# Patient Record
Sex: FEMALE | Race: OTHER | Employment: FULL TIME | URBAN - METROPOLITAN AREA
[De-identification: names, ages, dates, MRNs, and addresses within clinical notes are randomized per-mention and may not be internally consistent; named-entity substitution may affect disease eponyms.]

---

## 2020-02-12 ENCOUNTER — TELEPHONE (OUTPATIENT)
Dept: HEMATOLOGY/ONCOLOGY | Facility: HOSPITAL | Age: 25
End: 2020-02-12

## 2020-02-13 ENCOUNTER — APPOINTMENT (OUTPATIENT)
Dept: HEMATOLOGY/ONCOLOGY | Facility: HOSPITAL | Age: 25
End: 2020-02-13
Attending: INTERNAL MEDICINE
Payer: COMMERCIAL

## 2020-02-27 ENCOUNTER — LAB ENCOUNTER (OUTPATIENT)
Dept: LAB | Facility: HOSPITAL | Age: 25
End: 2020-02-27
Attending: INTERNAL MEDICINE
Payer: COMMERCIAL

## 2020-02-27 ENCOUNTER — OFFICE VISIT (OUTPATIENT)
Dept: HEMATOLOGY/ONCOLOGY | Facility: HOSPITAL | Age: 25
End: 2020-02-27
Attending: INTERNAL MEDICINE
Payer: COMMERCIAL

## 2020-02-27 VITALS
OXYGEN SATURATION: 99 % | DIASTOLIC BLOOD PRESSURE: 63 MMHG | RESPIRATION RATE: 16 BRPM | BODY MASS INDEX: 29.53 KG/M2 | SYSTOLIC BLOOD PRESSURE: 106 MMHG | HEIGHT: 64 IN | WEIGHT: 173 LBS | TEMPERATURE: 98 F | HEART RATE: 62 BPM

## 2020-02-27 DIAGNOSIS — D69.6 THROMBOCYTOPENIA (HCC): Primary | ICD-10-CM

## 2020-02-27 DIAGNOSIS — D69.6 THROMBOCYTOPENIA (HCC): ICD-10-CM

## 2020-02-27 LAB
ALBUMIN SERPL-MCNC: 4 G/DL (ref 3.4–5)
ALBUMIN/GLOB SERPL: 1.1 {RATIO} (ref 1–2)
ALP LIVER SERPL-CCNC: 86 U/L (ref 52–144)
ALT SERPL-CCNC: 27 U/L (ref 13–56)
ANION GAP SERPL CALC-SCNC: 5 MMOL/L (ref 0–18)
AST SERPL-CCNC: 16 U/L (ref 15–37)
BASOPHILS # BLD AUTO: 0.02 X10(3) UL (ref 0–0.2)
BASOPHILS NFR BLD AUTO: 0.3 %
BILIRUB SERPL-MCNC: 0.4 MG/DL (ref 0.1–2)
BUN BLD-MCNC: 18 MG/DL (ref 7–18)
BUN/CREAT SERPL: 23.7 (ref 10–20)
CALCIUM BLD-MCNC: 8.9 MG/DL (ref 8.5–10.1)
CHLORIDE SERPL-SCNC: 109 MMOL/L (ref 98–112)
CO2 SERPL-SCNC: 28 MMOL/L (ref 21–32)
CREAT BLD-MCNC: 0.76 MG/DL (ref 0.55–1.02)
DEPRECATED RDW RBC AUTO: 36.1 FL (ref 35.1–46.3)
EOSINOPHIL # BLD AUTO: 0.17 X10(3) UL (ref 0–0.7)
EOSINOPHIL NFR BLD AUTO: 2.1 %
ERYTHROCYTE [DISTWIDTH] IN BLOOD BY AUTOMATED COUNT: 11.8 % (ref 11–15)
FOLATE SERPL-MCNC: 11.7 NG/ML (ref 8.7–?)
GLOBULIN PLAS-MCNC: 3.5 G/DL (ref 2.8–4.4)
GLUCOSE BLD-MCNC: 68 MG/DL (ref 70–99)
HAV AB SER QL IA: REACTIVE
HAV IGM SER QL: NONREACTIVE
HBV CORE AB SERPL QL IA: NONREACTIVE
HBV SURFACE AB SER QL: REACTIVE
HBV SURFACE AB SERPL IA-ACNC: 477.87 MIU/ML
HBV SURFACE AG SERPL QL IA: NONREACTIVE
HCT VFR BLD AUTO: 41.8 % (ref 35–48)
HCV AB SERPL QL IA: NONREACTIVE
HGB BLD-MCNC: 14.5 G/DL (ref 12–16)
IMM GRANULOCYTES # BLD AUTO: 0.01 X10(3) UL (ref 0–1)
IMM GRANULOCYTES NFR BLD: 0.1 %
LYMPHOCYTES # BLD AUTO: 2.33 X10(3) UL (ref 1–4)
LYMPHOCYTES NFR BLD AUTO: 29.3 %
M PROTEIN MFR SERPL ELPH: 7.5 G/DL (ref 6.4–8.2)
MCH RBC QN AUTO: 29.6 PG (ref 26–34)
MCHC RBC AUTO-ENTMCNC: 34.7 G/DL (ref 31–37)
MCV RBC AUTO: 85.3 FL (ref 80–100)
MONOCYTES # BLD AUTO: 0.68 X10(3) UL (ref 0.1–1)
MONOCYTES NFR BLD AUTO: 8.5 %
NEUTROPHILS # BLD AUTO: 4.75 X10 (3) UL (ref 1.5–7.7)
NEUTROPHILS # BLD AUTO: 4.75 X10(3) UL (ref 1.5–7.7)
NEUTROPHILS NFR BLD AUTO: 59.7 %
OSMOLALITY SERPL CALC.SUM OF ELEC: 294 MOSM/KG (ref 275–295)
PATIENT FASTING Y/N/NP: NO
PLATELET # BLD AUTO: 148 10(3)UL (ref 150–450)
POTASSIUM SERPL-SCNC: 3.9 MMOL/L (ref 3.5–5.1)
RBC # BLD AUTO: 4.9 X10(6)UL (ref 3.8–5.3)
SODIUM SERPL-SCNC: 142 MMOL/L (ref 136–145)
VIT B12 SERPL-MCNC: 400 PG/ML (ref 193–986)
WBC # BLD AUTO: 8 X10(3) UL (ref 4–11)

## 2020-02-27 PROCEDURE — 86803 HEPATITIS C AB TEST: CPT

## 2020-02-27 PROCEDURE — 87340 HEPATITIS B SURFACE AG IA: CPT

## 2020-02-27 PROCEDURE — 80053 COMPREHEN METABOLIC PANEL: CPT

## 2020-02-27 PROCEDURE — 86704 HEP B CORE ANTIBODY TOTAL: CPT

## 2020-02-27 PROCEDURE — 86706 HEP B SURFACE ANTIBODY: CPT

## 2020-02-27 PROCEDURE — 85060 BLOOD SMEAR INTERPRETATION: CPT

## 2020-02-27 PROCEDURE — 87389 HIV-1 AG W/HIV-1&-2 AB AG IA: CPT

## 2020-02-27 PROCEDURE — 86709 HEPATITIS A IGM ANTIBODY: CPT

## 2020-02-27 PROCEDURE — 82607 VITAMIN B-12: CPT

## 2020-02-27 PROCEDURE — 85025 COMPLETE CBC W/AUTO DIFF WBC: CPT

## 2020-02-27 PROCEDURE — 86038 ANTINUCLEAR ANTIBODIES: CPT

## 2020-02-27 PROCEDURE — 86708 HEPATITIS A ANTIBODY: CPT

## 2020-02-27 PROCEDURE — 82746 ASSAY OF FOLIC ACID SERUM: CPT

## 2020-02-27 PROCEDURE — 36415 COLL VENOUS BLD VENIPUNCTURE: CPT

## 2020-02-27 PROCEDURE — 80500 HEPATITIS A B + C PROFILE: CPT

## 2020-02-27 PROCEDURE — 99204 OFFICE O/P NEW MOD 45 MIN: CPT | Performed by: INTERNAL MEDICINE

## 2020-02-27 PROCEDURE — 82525 ASSAY OF COPPER: CPT

## 2020-02-27 NOTE — PROGRESS NOTES
Hematology Consultation Note    Patient Name: Hany Beal   YOB: 1995   Medical Record Number: [id number]   CSN: [id number]   Consulting Physician: Lore Ventura MD  Referring Physician(s): Contreras Murphy  Date of Consultation: 2/27/2020 possibly head&neck vs. gastric   • Bleeding Disorders Neg    • Clotting Disorder Neg        Social History:  Social History    Socioeconomic History      Marital status:       Spouse name: Not on file      Number of children: Not on file      Years of Systems:    Constitutional: Negative for anorexia, chills, fatigue, fevers, night sweats and weight loss. Eyes: Negative for visual disturbance, irritation and redness. Respiratory: Negative for cough, hemoptysis, chest pain, or dyspnea on exertion. MCV 85.3 02/27/2020 03:09 PM    MCH 29.6 02/27/2020 03:09 PM    MCHC 34.7 02/27/2020 03:09 PM    RDW 11.8 02/27/2020 03:09 PM    NEPRELIM 4.75 02/27/2020 03:09 PM    .0 (L) 02/27/2020 03:09 PM       Lab Results   Component Value Date/Time    GLU not on any prescription medications which could be contributing  --Plan to rule out nutritional deficiencies such as Z79, folic acid deficiency as well as copper deficiency, low clinical suspicion as patient eats a complete diet and does not take over-the-

## 2020-02-29 LAB — COPPER, SERUM: 89.8 UG/DL

## 2020-03-02 ENCOUNTER — TELEPHONE (OUTPATIENT)
Dept: HEMATOLOGY/ONCOLOGY | Facility: HOSPITAL | Age: 25
End: 2020-03-02

## 2020-03-02 DIAGNOSIS — D69.6 THROMBOCYTOPENIA (HCC): Primary | ICD-10-CM

## 2020-03-02 LAB — NUCLEAR IGG TITR SER IF: NEGATIVE {TITER}

## 2020-03-03 ENCOUNTER — TELEPHONE (OUTPATIENT)
Dept: HEMATOLOGY/ONCOLOGY | Facility: HOSPITAL | Age: 25
End: 2020-03-03

## 2020-05-13 ENCOUNTER — TELEPHONE (OUTPATIENT)
Dept: HEMATOLOGY/ONCOLOGY | Facility: HOSPITAL | Age: 25
End: 2020-05-13

## 2020-05-15 ENCOUNTER — TELEPHONE (OUTPATIENT)
Dept: HEMATOLOGY/ONCOLOGY | Facility: HOSPITAL | Age: 25
End: 2020-05-15

## 2020-05-15 NOTE — TELEPHONE ENCOUNTER
Requesting call back to revise upcoming appt. Change to either 5/26/20 at 9 am or 5/28/20 at 1:30 pm.  Schedule conflict otherwise. Also patient called to discuss February 2020 lab results.

## 2020-05-27 ENCOUNTER — TELEPHONE (OUTPATIENT)
Dept: HEMATOLOGY/ONCOLOGY | Facility: HOSPITAL | Age: 25
End: 2020-05-27

## 2020-05-27 NOTE — TELEPHONE ENCOUNTER
Kelsey Jaramillo requested \" please cancel my appointment for 5/27/20. I will call to reschedule when I am sure I can come. \"

## 2020-05-28 ENCOUNTER — APPOINTMENT (OUTPATIENT)
Dept: HEMATOLOGY/ONCOLOGY | Facility: HOSPITAL | Age: 25
End: 2020-05-28
Attending: INTERNAL MEDICINE
Payer: COMMERCIAL

## 2020-07-02 ENCOUNTER — TELEPHONE (OUTPATIENT)
Dept: HEMATOLOGY/ONCOLOGY | Facility: HOSPITAL | Age: 25
End: 2020-07-02

## 2020-07-02 NOTE — TELEPHONE ENCOUNTER
LM indicating she is over due for her 3 month f/u with labs for her lowered platelet count (due in May), several appts made and cancelled. Please call our office for an appt, order is in place for labwork.   Ask to speak with clinic RN for any questions re

## 2020-07-28 ENCOUNTER — TELEPHONE (OUTPATIENT)
Dept: HEMATOLOGY/ONCOLOGY | Facility: HOSPITAL | Age: 25
End: 2020-07-28

## 2020-07-28 NOTE — TELEPHONE ENCOUNTER
LM regarding f/u appt with Dr Moiz Houston for low platelet count. Order in place for repeat CBC to check level. Please call our office if unable to make appt for any reason so we can ensure she is being followed for her health.

## 2021-01-20 ENCOUNTER — OFFICE VISIT (OUTPATIENT)
Dept: OBGYN CLINIC | Facility: CLINIC | Age: 26
End: 2021-01-20
Payer: COMMERCIAL

## 2021-01-20 VITALS — SYSTOLIC BLOOD PRESSURE: 110 MMHG | BODY MASS INDEX: 30 KG/M2 | WEIGHT: 177.19 LBS | DIASTOLIC BLOOD PRESSURE: 62 MMHG

## 2021-01-20 DIAGNOSIS — Z01.419 ENCOUNTER FOR WELL WOMAN EXAM WITH ROUTINE GYNECOLOGICAL EXAM: Primary | ICD-10-CM

## 2021-01-20 DIAGNOSIS — Z01.419 ENCOUNTER FOR GYNECOLOGICAL EXAMINATION WITHOUT ABNORMAL FINDING: ICD-10-CM

## 2021-01-20 PROCEDURE — 99385 PREV VISIT NEW AGE 18-39: CPT | Performed by: OBSTETRICS & GYNECOLOGY

## 2021-01-20 PROCEDURE — 3074F SYST BP LT 130 MM HG: CPT | Performed by: OBSTETRICS & GYNECOLOGY

## 2021-01-20 PROCEDURE — 3078F DIAST BP <80 MM HG: CPT | Performed by: OBSTETRICS & GYNECOLOGY

## 2021-01-20 NOTE — PROGRESS NOTES
HPI:    Patient ID: Alexandru Lang is a 23year old female. Patient here for routine exam.  New to office. Has Nexplanon in for almost three years. Patient reports vaginal dryness.   Discussed seeing CNP for Nexplanon removal and contraception consultation Judgment and thought content normal.   Nursing note and vitals reviewed.              ASSESSMENT/PLAN:   Encounter for well woman exam with routine gynecological exam  (primary encounter diagnosis)  Encounter for gynecological examination without abnormal f

## 2021-01-27 LAB — HPV I/H RISK 1 DNA SPEC QL NAA+PROBE: NEGATIVE

## 2021-02-01 ENCOUNTER — OFFICE VISIT (OUTPATIENT)
Dept: OBGYN CLINIC | Facility: CLINIC | Age: 26
End: 2021-02-01
Payer: COMMERCIAL

## 2021-02-01 VITALS
HEART RATE: 65 BPM | WEIGHT: 179 LBS | SYSTOLIC BLOOD PRESSURE: 116 MMHG | BODY MASS INDEX: 31 KG/M2 | DIASTOLIC BLOOD PRESSURE: 77 MMHG

## 2021-02-01 DIAGNOSIS — F32.A MILD EPISODE OF DEPRESSION: ICD-10-CM

## 2021-02-01 DIAGNOSIS — Z30.46 NEXPLANON REMOVAL: Primary | ICD-10-CM

## 2021-02-01 DIAGNOSIS — Z30.011 BCP (BIRTH CONTROL PILLS) INITIATION: ICD-10-CM

## 2021-02-01 PROCEDURE — 3078F DIAST BP <80 MM HG: CPT | Performed by: ADVANCED PRACTICE MIDWIFE

## 2021-02-01 PROCEDURE — 3074F SYST BP LT 130 MM HG: CPT | Performed by: ADVANCED PRACTICE MIDWIFE

## 2021-02-01 PROCEDURE — 11976 REMOVE CONTRACEPTIVE CAPSULE: CPT | Performed by: ADVANCED PRACTICE MIDWIFE

## 2021-02-01 RX ORDER — NORETHINDRONE ACETATE AND ETHINYL ESTRADIOL, ETHINYL ESTRADIOL AND FERROUS FUMARATE 1MG-10(24)
1 KIT ORAL DAILY
Qty: 28 TABLET | Refills: 11 | Status: SHIPPED | OUTPATIENT
Start: 2021-02-01 | End: 2021-03-01

## 2021-02-01 NOTE — PROCEDURES
Nexplanon Insertion/Removal    Pregnancy Results: negative from urine test   Birth control method(s) used:  ; date last used:    Consent was obtained from the patient.     Removal:  1 % lidocaine with Epinephrine was injected underneath the tip of the Nexpl

## 2022-06-23 ENCOUNTER — OFFICE VISIT (OUTPATIENT)
Dept: OBGYN CLINIC | Facility: CLINIC | Age: 27
End: 2022-06-23
Payer: COMMERCIAL

## 2022-06-23 VITALS — BODY MASS INDEX: 30 KG/M2 | WEIGHT: 172.81 LBS | DIASTOLIC BLOOD PRESSURE: 58 MMHG | SYSTOLIC BLOOD PRESSURE: 98 MMHG

## 2022-06-23 DIAGNOSIS — N94.10 DYSPAREUNIA IN FEMALE: ICD-10-CM

## 2022-06-23 DIAGNOSIS — N92.6 MISSED MENSES: Primary | ICD-10-CM

## 2022-06-23 DIAGNOSIS — Z01.419 ENCOUNTER FOR GYNECOLOGICAL EXAMINATION WITHOUT ABNORMAL FINDING: ICD-10-CM

## 2022-06-23 LAB
CONTROL LINE PRESENT WITH A CLEAR BACKGROUND (YES/NO): YES YES/NO
KIT LOT #: NORMAL NUMERIC
PREGNANCY TEST, URINE: NEGATIVE

## 2022-06-23 PROCEDURE — 81025 URINE PREGNANCY TEST: CPT | Performed by: OBSTETRICS & GYNECOLOGY

## 2022-06-23 PROCEDURE — 99395 PREV VISIT EST AGE 18-39: CPT | Performed by: OBSTETRICS & GYNECOLOGY

## 2022-06-23 PROCEDURE — 3078F DIAST BP <80 MM HG: CPT | Performed by: OBSTETRICS & GYNECOLOGY

## 2022-06-23 PROCEDURE — 3074F SYST BP LT 130 MM HG: CPT | Performed by: OBSTETRICS & GYNECOLOGY

## 2022-06-23 RX ORDER — CETIRIZINE HYDROCHLORIDE 10 MG/1
CAPSULE, LIQUID FILLED ORAL
COMMUNITY
Start: 2022-05-01

## 2023-07-11 ENCOUNTER — OFFICE VISIT (OUTPATIENT)
Dept: OBGYN CLINIC | Facility: CLINIC | Age: 28
End: 2023-07-11

## 2023-07-11 VITALS
BODY MASS INDEX: 26.8 KG/M2 | HEIGHT: 64 IN | DIASTOLIC BLOOD PRESSURE: 62 MMHG | SYSTOLIC BLOOD PRESSURE: 118 MMHG | WEIGHT: 157 LBS

## 2023-07-11 DIAGNOSIS — N92.6 MISSED MENSES: ICD-10-CM

## 2023-07-11 DIAGNOSIS — Z01.419 NORMAL GYNECOLOGIC EXAMINATION: Primary | ICD-10-CM

## 2023-07-11 PROCEDURE — 99395 PREV VISIT EST AGE 18-39: CPT | Performed by: OBSTETRICS & GYNECOLOGY

## 2023-07-11 PROCEDURE — 3008F BODY MASS INDEX DOCD: CPT | Performed by: OBSTETRICS & GYNECOLOGY

## 2023-07-11 PROCEDURE — 3074F SYST BP LT 130 MM HG: CPT | Performed by: OBSTETRICS & GYNECOLOGY

## 2023-07-11 PROCEDURE — 3078F DIAST BP <80 MM HG: CPT | Performed by: OBSTETRICS & GYNECOLOGY

## 2023-07-11 PROCEDURE — 81025 URINE PREGNANCY TEST: CPT | Performed by: OBSTETRICS & GYNECOLOGY

## 2023-07-11 NOTE — PROGRESS NOTES
Stu Franklin is a 24year old female No obstetric history on file. Patient's last menstrual period was 05/08/2023 (approximate). Patient presents with:  Gyn Exam: Annual  C/o pain during and after sex      OBSTETRICS HISTORY:     OB History   No obstetric history on file. GYNE HISTORY:     Hx Prior Abnormal Pap: No   Menarche: 14 (7/11/2023  9:25 AM)  Period Cycle (Days): 28 (7/11/2023  9:25 AM)  Period Duration (Days): 5 (7/11/2023  9:25 AM)  Period Flow: normal (7/11/2023  9:25 AM)  Use of Birth Control (if yes, specify type): None (7/11/2023  9:25 AM)  Hx Prior Abnormal Pap: No (7/11/2023  9:25 AM)        Latest Ref Rng & Units 1/20/2021     5:22 PM   RECENT PAP RESULTS   Thinprep Pap Negative for intraepithelial lesion or malignancy Negative for intraepithelial lesion or malignancy    HPV Negative Negative          MEDICAL HISTORY:     Past Medical History:   Diagnosis Date    Thrombocytopenia ([address]     Vitamin D deficiency        SURGICAL HISTORY:     Past Surgical History:   Procedure Laterality Date    TONSILLECTOMY      <age 14; no associated bleeding complications       SOCIAL HISTORY:     Social History     Socioeconomic History    Marital status:    Tobacco Use    Smoking status: Never    Smokeless tobacco: Never   Vaping Use    Vaping Use: Never used   Substance and Sexual Activity    Alcohol use: Yes     Comment: occasional social use with friends    Drug use: Never   Social History Narrative    Renetta Adams is newly  for the last couple of months. She has no children. 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  She and her  live in [place], [place].        FAMILY HISTORY:     Family History   Problem Relation Age of Onset    Cancer Other         maternal great grandmother; unknown etiology possibly head&neck vs. gastric    Bleeding Disorders Neg     Clotting Disorder Neg        MEDICATIONS:       Current Outpatient Medications:     cholecalciferol 125 MCG (5000 UT) Oral Cap, Take 1 capsule (5,000 Units total) by mouth daily. , Disp: , Rfl:     Cetirizine HCl (ZYRTEC ALLERGY) 10 MG Oral Cap, , Disp: , Rfl:     ALLERGIES:       Ibuprofen               RASH, SWELLING      REVIEW OF SYSTEMS:     Constitutional:    denies fever / chills  Eyes:     denies blurred or double vision  Cardiovascular:  denies chest pain or palpitations  Respiratory:    denies shortness of breath  Gastrointestinal:  denies severe abdominal pain, frequent diarrhea or constipation, nausea / vomiting  Genitourinary:    denies dysuria, bothersome incontinence  Skin/Breast:   denies any breast pain, lumps, or discharge  Neurological:    denies frequent severe headaches  Psychiatric:   denies depression or anxiety, thoughts of harming self or others  Heme/Lymph:    denies easy bruising or bleeding      PHYSICAL EXAM:   Blood pressure 118/62, height 5' 4\" (1.626 m), weight 157 lb (71.2 kg), last menstrual period 05/08/2023, not currently breastfeeding. Constitutional:  well developed, well nourished  Head/Face:  normocephalic  Neck/Thyroid: thyroid symmetric, no thyromegaly, no nodules, no adenopathy  Lymphatic: no abnormal supraclavicular or axillary adenopathy is noted  Breast:   normal without palpable masses, tenderness, asymmetry, nipple discharge, nipple retraction or skin changes  Abdomen:   soft, nontender, nondistended, no masses  Skin/Hair:  no unusual rashes or bruises  Extremities:  no edema, no cyanosis, non tender bilaterally  Psychiatric:   oriented to time, place, person and situation.  Appropriate mood and affect    Pelvic Exam:  External Genitalia:  normal appearance, hair distribution, and no lesions  Urethral Meatus:   normal in size, location, without lesions   Bladder:    no fullness, masses or tenderness  Vagina:    normal appearance without lesions, no abnormal discharge  Cervix:    No lesions, normal friability   Uterus:    normal in size, 8 wk sized, normal contour, position, mobility, without  motion tenderness  Adnexa:   normal without masses or tenderness  Perineum:   normal  Anus: no hemorroids         ASSESSMENT & PLAN:     Mira Bean was seen today for gyn exam.    Diagnoses and all orders for this visit:    Normal gynecologic examination  -     THINPREP PAP WITH HPV REFLEX REQUEST B; Future  -     CHLAMYDIA/GONOCOCCUS, ELKE; Future    Missed menses  -     URINE PREGNANCY TEST  Upt negative    Dyspareunia- exam normal. Offiered progesterone ocp for rx but declined.  Therefore no rx needed        FOLLOW-UP     Return in about 1 year (around 7/11/2024) for Annual exam.      Yudy Blanco MD  7/11/2023

## 2023-07-12 LAB
C TRACH DNA SPEC QL NAA+PROBE: NEGATIVE
N GONORRHOEA DNA SPEC QL NAA+PROBE: NEGATIVE

## 2024-09-09 ENCOUNTER — OFFICE VISIT (OUTPATIENT)
Dept: OBGYN CLINIC | Facility: CLINIC | Age: 29
End: 2024-09-09

## 2024-09-09 VITALS
HEART RATE: 56 BPM | WEIGHT: 154 LBS | SYSTOLIC BLOOD PRESSURE: 100 MMHG | BODY MASS INDEX: 26.29 KG/M2 | DIASTOLIC BLOOD PRESSURE: 64 MMHG | HEIGHT: 64 IN

## 2024-09-09 DIAGNOSIS — Z01.419 NORMAL GYNECOLOGIC EXAMINATION: Primary | ICD-10-CM

## 2024-09-09 PROCEDURE — 99395 PREV VISIT EST AGE 18-39: CPT | Performed by: OBSTETRICS & GYNECOLOGY

## 2024-09-09 NOTE — PROGRESS NOTES
Orquidea Helton is a 29 year old female No obstetric history on file. Patient's last menstrual period was 08/19/2024 (exact date).   Chief Complaint   Patient presents with    Annual     Pt here for annual visit      No c/o. Declines contraception  OBSTETRICS HISTORY:     OB History   No obstetric history on file.       GYNE HISTORY:     Hx Prior Abnormal Pap: No  Pap Date: 07/11/23  Pap Result Notes: wnl   Period Cycle (Days): irregular (9/9/2024 10:40 AM)  Period Duration (Days): 6 (9/9/2024 10:40 AM)  Use of Birth Control (if yes, specify type): None (9/9/2024 10:40 AM)  Hx Prior Abnormal Pap: No (9/9/2024 10:40 AM)  Pap Date: 07/11/23 (9/9/2024 10:40 AM)  Pap Result Notes: wnl (9/9/2024 10:40 AM)        Latest Ref Rng & Units 7/11/2023    10:18 AM 1/20/2021     5:22 PM   RECENT PAP RESULTS   Thinprep Pap Negative for intraepithelial lesion or malignancy Negative for intraepithelial lesion or malignancy  Negative for intraepithelial lesion or malignancy    HPV Negative  Negative          MEDICAL HISTORY:     Past Medical History:    Thrombocytopenia ([facility])    Vitamin D deficiency       SURGICAL HISTORY:     Past Surgical History:   Procedure Laterality Date    Tonsillectomy      <age 14; no associated bleeding complications       SOCIAL HISTORY:     Social History     Socioeconomic History    Marital status:    Tobacco Use    Smoking status: Never    Smokeless tobacco: Never   Vaping Use    Vaping status: Never Used   Substance and Sexual Activity    Alcohol use: Yes     Comment: occasional social use with friends    Drug use: Never   Social History Narrative    Orquidea is newly  for the last couple of months. She has no children. 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        FAMILY HISTORY:     Family History   Problem Relation Age of Onset    Cancer Other         maternal great grandmother; unknown etiology possibly head&neck vs.  gastric    Bleeding Disorders Neg     Clotting Disorder Neg        MEDICATIONS:       Current Outpatient Medications:     cholecalciferol 125 MCG (5000 UT) Oral Cap, Take 1 capsule (5,000 Units total) by mouth daily., Disp: , Rfl:     Cetirizine HCl (ZYRTEC ALLERGY) 10 MG Oral Cap, , Disp: , Rfl:     ALLERGIES:       Allergies   Allergen Reactions    Ibuprofen RASH and SWELLING         REVIEW OF SYSTEMS:     Constitutional:    denies fever / chills  Eyes:     denies blurred or double vision  Cardiovascular:  denies chest pain or palpitations  Respiratory:    denies shortness of breath  Gastrointestinal:  denies severe abdominal pain, frequent diarrhea or constipation, nausea / vomiting  Genitourinary:    denies dysuria, bothersome incontinence  Skin/Breast:   denies any breast pain, lumps, or discharge  Neurological:    denies frequent severe headaches  Psychiatric:   denies depression or anxiety, thoughts of harming self or others  Heme/Lymph:    denies easy bruising or bleeding      PHYSICAL EXAM:   Blood pressure 100/64, pulse 56, height 5' 4\" (1.626 m), weight 154 lb (69.9 kg), last menstrual period 08/19/2024, not currently breastfeeding.  Constitutional:  well developed, well nourished  Head/Face:  normocephalic  Neck/Thyroid: thyroid symmetric, no thyromegaly, no nodules, no adenopathy  Lymphatic: no abnormal supraclavicular or axillary adenopathy is noted  Respiratory:      chest wall symmetric and nontender on palpation, clear to asculation bilateral, no wheezing, rales, ronchi, and resonance normal upon percussion  Cardiovascular: chest normal in appearance, regular rate and rhythm, no murmurs, PMI palpated midclavicular line  Breast:   normal bilaterally without palpable masses, tenderness, asymmetry, nipple discharge, nipple retraction or skin changes bilaterally  Abdomen:   soft, nontender, nondistended, no masses  Skin/Hair:  no unusual rashes or bruises  Extremities:  no edema, no cyanosis, non tender  bilaterally  Psychiatric:   oriented to time, place, person and situation. Appropriate mood and affect    Pelvic Exam:  External Genitalia:  normal appearance, hair distribution, and no lesions  Urethral Meatus:   normal in size, location, without lesions   Bladder:    no fullness, masses or tenderness  Vagina:    normal appearance without lesions, no abnormal discharge  Cervix:    No lesions, normal friability   Uterus:    normal in size, 8 wk sized, normal contour, position, mobility, without  motion tenderness  Adnexa:   normal without masses or tenderness  Perineum:   normal  Anus: no hemorroids         ASSESSMENT & PLAN:     Orquidea was seen today for annual.    Diagnoses and all orders for this visit:    Normal gynecologic examination  -     ThinPrep Pap with HPV Reflex, Chlamydia/GC; Future  -     Chlamydia/Gc Amplification; Future      Nutrition, weight screening and exercise were discussed with the patient.  Exercise should encompass approximately 150 minutes/week.  Self breast exam counseling was also given.  I advised the patient to avoid tobacco, drugs and alcohol.  Influenza vaccine was offered if seasonally appropriate.  HPV and STD prevention counseling was given.  Health maintenance checklist  was reviewed including Pap smear, cervical cultures, and mammogram screening if age-appropriate.  Appropriate follow-up scheduling was discussed with the patient.        FOLLOW-UP     Return in about 1 year (around 9/9/2025) for Annual exam.      Syd Najera MD  9/9/2024

## 2024-09-10 LAB
C TRACH DNA SPEC QL NAA+PROBE: NEGATIVE
N GONORRHOEA DNA SPEC QL NAA+PROBE: NEGATIVE

## 2025-04-24 ENCOUNTER — OFFICE VISIT (OUTPATIENT)
Dept: OBGYN CLINIC | Facility: CLINIC | Age: 30
End: 2025-04-24
Payer: COMMERCIAL

## 2025-04-24 VITALS
SYSTOLIC BLOOD PRESSURE: 100 MMHG | HEART RATE: 51 BPM | BODY MASS INDEX: 27.66 KG/M2 | HEIGHT: 64 IN | WEIGHT: 162 LBS | DIASTOLIC BLOOD PRESSURE: 67 MMHG

## 2025-04-24 DIAGNOSIS — B37.31 VAGINAL CANDIDIASIS: Primary | ICD-10-CM

## 2025-04-24 PROCEDURE — 99212 OFFICE O/P EST SF 10 MIN: CPT | Performed by: OBSTETRICS & GYNECOLOGY

## 2025-04-24 RX ORDER — EPINEPHRINE 0.3 MG/.3ML
INJECTION SUBCUTANEOUS
COMMUNITY

## 2025-04-24 RX ORDER — TERCONAZOLE 4 MG/G
1 CREAM VAGINAL NIGHTLY
Qty: 7 EACH | Refills: 0 | Status: SHIPPED | OUTPATIENT
Start: 2025-04-24 | End: 2025-05-01

## 2025-04-24 NOTE — PROGRESS NOTES
Orquidea Helton is a 29 year old female No obstetric history on file. Patient's last menstrual period was 04/10/2025 (approximate).   Chief Complaint   Patient presents with    Annual     Patient presents for Annual and also has concerns of low sex drive     Pt c/o decreased sex drive  OBSTETRICS HISTORY:     OB History   No obstetric history on file.       GYNE HISTORY:     Hx Prior Abnormal Pap: No   Period Cycle (Days): 28-30 (4/24/2025 11:07 AM)  Period Duration (Days): 6-7 days (4/24/2025 11:07 AM)  Period Flow: heavy-light (4/24/2025 11:07 AM)  Use of Birth Control (if yes, specify type): None (4/24/2025 11:07 AM)  Hx Prior Abnormal Pap: No (4/24/2025 11:07 AM)  Pap Date: 07/11/23 (9/9/2024 10:40 AM)  Pap Result Notes: wnl (9/9/2024 10:40 AM)        Latest Ref Rng & Units 9/9/2024    10:50 AM 7/11/2023    10:18 AM 1/20/2021     5:22 PM   RECENT PAP RESULTS   INTERPRETATION/RESULT: Negative for intraepithelial lesion or malignancy Negative for intraepithelial lesion or malignancy  Negative for intraepithelial lesion or malignancy  Negative for intraepithelial lesion or malignancy    HPV Negative   Negative          MEDICAL HISTORY:     Past Medical History[1]    SURGICAL HISTORY:     Past Surgical History[2]    SOCIAL HISTORY:     Short Social Hx on File[3]     FAMILY HISTORY:     Family History[4]    MEDICATIONS:     Medications - Current[5]    ALLERGIES:     Allergies[6]      REVIEW OF SYSTEMS:     Constitutional:    denies fever / chills  Eyes:     denies blurred or double vision  Cardiovascular:  denies chest pain or palpitations  Respiratory:    denies shortness of breath  Gastrointestinal:  denies severe abdominal pain, frequent diarrhea or constipation, nausea / vomiting  Genitourinary:    denies dysuria, bothersome incontinence  Skin/Breast:   denies any breast pain, lumps, or discharge  Neurological:    denies frequent severe headaches  Psychiatric:   denies depression or anxiety, thoughts of harming self  or others  Heme/Lymph:    denies easy bruising or bleeding      PHYSICAL EXAM:   Blood pressure 100/67, pulse 51, height 5' 4\" (1.626 m), weight 162 lb (73.5 kg), last menstrual period 04/10/2025, not currently breastfeeding.  Constitutional:  well developed, well nourished  Head/Face:  normocephalic  Neck/Thyroid: thyroid symmetric, no thyromegaly, no nodules, no adenopathy  Lymphatic: no abnormal supraclavicular or axillary adenopathy is noted  Respiratory:      chest wall symmetric and nontender on palpation, clear to asculation bilateral, no wheezing, rales, ronchi, and resonance normal upon percussion  Cardiovascular: chest normal in appearance, regular rate and rhythm, no murmurs, PMI palpated midclavicular line  Abdomen:   soft, nontender, nondistended, no masses  Skin/Hair:  no unusual rashes or bruises  Extremities:  no edema, no cyanosis, non tender bilaterally  Psychiatric:   oriented to time, place, person and situation. Appropriate mood and affect    Pelvic Exam:  External Genitalia:  normal appearance, hair distribution, and no lesions  Urethral Meatus:   normal in size, location, without lesions   Bladder:    no fullness, masses or tenderness  Vagina:    normal appearance without lesions, moderate thick white discharge ph <4.5  Cervix:    No lesions, normal friability   Uterus:    normal in size, 8 wk sized, normal contour, position, mobility, without  motion tenderness  Adnexa:   normal without masses or tenderness  Perineum:   normal  Anus: no hemorroids         ASSESSMENT & PLAN:     Orquidea was seen today for annual.    Diagnoses and all orders for this visit:    Vaginal candidiasis  -     teraconazole 0.4 % Vaginal Cream; Place 1 applicator vaginally nightly for 7 days.    Decrease desire- no rx availabe at this time. Candida can cause dry and dec desire      FOLLOW-UP     No follow-ups on file.      Syd Najera MD  4/24/2025       [1]   Past Medical History:   Thrombocytopenia    Vitamin D  deficiency   [2]   Past Surgical History:  Procedure Laterality Date    Tonsillectomy      <age 14; no associated bleeding complications   [3]   Social History  Socioeconomic History    Marital status:    Tobacco Use    Smoking status: Never     Passive exposure: Never    Smokeless tobacco: Never   Vaping Use    Vaping status: Never Used   Substance and Sexual Activity    Alcohol use: Yes     Comment: occasional social use with friends    Drug use: Never   Social History Narrative    Orquidea is newly  for the last couple of months. She has no children. 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     Social Drivers of Health     Food Insecurity: No Food Insecurity (4/24/2025)    NCSS - Food Insecurity     Worried About Running Out of Food in the Last Year: No     Ran Out of Food in the Last Year: No   Transportation Needs: No Transportation Needs (4/24/2025)    NCSS - Transportation     Lack of Transportation: No   Housing Stability: Not At Risk (4/24/2025)    NCSS - Housing/Utilities     Has Housing: Yes     Worried About Losing Housing: No     Unable to Get Utilities: No   [4]   Family History  Problem Relation Age of Onset    Cancer Other         maternal great grandmother; unknown etiology possibly head&neck vs. gastric    Bleeding Disorders Neg     Clotting Disorder Neg    [5]   Current Outpatient Medications:     teraconazole 0.4 % Vaginal Cream, Place 1 applicator vaginally nightly for 7 days., Disp: 7 each, Rfl: 0    EPINEPHrine 0.3 MG/0.3ML Injection Solution Auto-injector, INJECT 1 PEN BY INJECTION ROUTE AS NEEDED., Disp: , Rfl:     cholecalciferol 125 MCG (5000 UT) Oral Cap, Take 1 capsule (5,000 Units total) by mouth daily., Disp: , Rfl:     Cetirizine HCl (ZYRTEC ALLERGY) 10 MG Oral Cap, , Disp: , Rfl:   [6]   Allergies  Allergen Reactions    Ibuprofen RASH and SWELLING

## 2025-05-22 ENCOUNTER — TELEPHONE (OUTPATIENT)
Age: 30
End: 2025-05-22

## 2025-05-23 ENCOUNTER — TELEPHONE (OUTPATIENT)
Age: 30
End: 2025-05-23

## 2025-05-28 ENCOUNTER — TELEPHONE (OUTPATIENT)
Age: 30
End: 2025-05-28

## (undated) NOTE — LETTER
AUTHORIZATION FOR SURGICAL OPERATION OR OTHER PROCEDURE    1.  I hereby authorize Dr. Terrace Dancer, and [facility] staff assigned to my case to perform the following operation and/or procedure at the [facility]  _________ Time:  ________ A. M.  P.M.        Patient Name:  ______________________________________________________  (please print)      Patient signature:  ___________________________________________________             Relationship to Patient: